# Patient Record
(demographics unavailable — no encounter records)

---

## 2025-03-25 NOTE — ASSESSMENT
[FreeTextEntry1] : 31-year-old female who presents for recurrent UTI  Counseled the patient about UTI prevention strategies including water, cranberry, woman's health probiotic, d-mannose.  Will send her urine today for urinalysis and urine culture.  We also discussed strategies around the time of travel and intercourse for preventing infections as she feels this is when she is at highest risk.  If she has interval infections, advised to call the office and we can obtain urine studies.  Return to clinic in 6 months or sooner if interval issues

## 2025-03-25 NOTE — REASON FOR VISIT
[TextEntry] : 31-year-old female who presents for recurrent UTI  Patient reports that she has about 5-7 UTIs per year.  This is been going on for the last 1 to 2 years.  Her symptoms include urgency, dysuria, back pain.  She also experiences gross hematuria.  She notices an association with intercourse as well as travel.  She endorses urinary holding and minimal fluid intake as well as constipation when she travels.  She denies new sexual partners or concern for STD.  She generally goes to urgent care where she is given antibiotics and her symptoms improve.  At baseline she voids every 2 hours which she feels is appropriate based on her water intake.  She drinks about 65 ounces per day.  She denies nocturia.  She denies urgency.  She denies urge incontinence or stress incontinence.  She denies other episodes of gross hematuria.  She denies straining with bladder emptying.  She denies kidney stone history.  She is a never smoker, denies chemical exposure.  She is a .  She denies pelvic surgery.  She is currently on birth control but has been on this for longer than her UTI started.  She denies vaginal dryness.  She has regular bowels  She denies diabetes She has hypothyroidism and is on Synthroid. She has anxiety and takes buspirone, Wellbutrin, escitalopram.  PVR minimal  Urine culture - 2024 Creatinine 1.03 2024 Urine culture 2024 negative UA 2 RBC, calcium oxalate crystals, 8 epithelial cells 2024